# Patient Record
Sex: FEMALE | Race: BLACK OR AFRICAN AMERICAN | NOT HISPANIC OR LATINO | URBAN - METROPOLITAN AREA
[De-identification: names, ages, dates, MRNs, and addresses within clinical notes are randomized per-mention and may not be internally consistent; named-entity substitution may affect disease eponyms.]

---

## 2023-04-28 ENCOUNTER — OFFICE VISIT (OUTPATIENT)
Dept: URBAN - METROPOLITAN AREA CLINIC 84 | Facility: CLINIC | Age: 63
End: 2023-04-28

## 2023-04-28 RX ORDER — BUDESONIDE AND FORMOTEROL FUMARATE DIHYDRATE 160; 4.5 UG/1; UG/1
INHALE 2 PUFFS BY INHALATION ROUTE 2 TIMES PER DAY IN THE MORNING AND EVENING AEROSOL RESPIRATORY (INHALATION) 2
Qty: 1 | Refills: 0 | COMMUNITY
Start: 1900-01-01

## 2023-04-28 RX ORDER — IBUPROFEN 200 MG
TAKE 1 TABLET (200 MG) BY ORAL ROUTE EVERY 6 HOURS AS NEEDED WITH FOOD TABLET ORAL
Qty: 0 | Refills: 0 | COMMUNITY
Start: 1900-01-01

## 2023-04-28 RX ORDER — FLUTICASONE PROPIONATE 50 UG/1
SPRAY ONCE IN EACH NOSTRIL ONCE DAILY SPRAY, METERED NASAL
Qty: 16 GRAM | Refills: 0 | Status: ACTIVE | COMMUNITY

## 2023-04-28 RX ORDER — ALBUTEROL SULFATE 108 UG/1
INHALE 1 PUFF (90 MCG) BY INHALATION ROUTE EVERY 4-6 HOURS AS NEEDED AEROSOL, METERED RESPIRATORY (INHALATION)
Qty: 1 | Refills: 0 | COMMUNITY
Start: 1900-01-01

## 2023-04-28 RX ORDER — CETIRIZINE HYDROCHLORIDE TABLETS 10 MG/1
TAKE 1 TABLET BY MOUTH EVERY DAY AS NEEDED TABLET, FILM COATED ORAL
Qty: 30 EACH | Refills: 0 | Status: ACTIVE | COMMUNITY

## 2023-06-28 ENCOUNTER — OFFICE VISIT (OUTPATIENT)
Dept: URBAN - METROPOLITAN AREA CLINIC 92 | Facility: CLINIC | Age: 63
End: 2023-06-28

## 2023-08-22 ENCOUNTER — DASHBOARD ENCOUNTERS (OUTPATIENT)
Age: 63
End: 2023-08-22

## 2023-08-22 ENCOUNTER — LAB OUTSIDE AN ENCOUNTER (OUTPATIENT)
Dept: URBAN - METROPOLITAN AREA CLINIC 92 | Facility: CLINIC | Age: 63
End: 2023-08-22

## 2023-08-22 ENCOUNTER — OFFICE VISIT (OUTPATIENT)
Dept: URBAN - METROPOLITAN AREA CLINIC 92 | Facility: CLINIC | Age: 63
End: 2023-08-22
Payer: COMMERCIAL

## 2023-08-22 ENCOUNTER — TELEPHONE ENCOUNTER (OUTPATIENT)
Dept: URBAN - METROPOLITAN AREA CLINIC 92 | Facility: CLINIC | Age: 63
End: 2023-08-22

## 2023-08-22 ENCOUNTER — WEB ENCOUNTER (OUTPATIENT)
Dept: URBAN - METROPOLITAN AREA CLINIC 92 | Facility: CLINIC | Age: 63
End: 2023-08-22

## 2023-08-22 VITALS
DIASTOLIC BLOOD PRESSURE: 80 MMHG | HEART RATE: 72 BPM | HEIGHT: 62 IN | WEIGHT: 132 LBS | BODY MASS INDEX: 24.29 KG/M2 | SYSTOLIC BLOOD PRESSURE: 168 MMHG | TEMPERATURE: 97.2 F

## 2023-08-22 DIAGNOSIS — Z12.11 SCREEN FOR COLON CANCER: ICD-10-CM

## 2023-08-22 DIAGNOSIS — K76.89 HEPATIC CYST: ICD-10-CM

## 2023-08-22 PROCEDURE — 99204 OFFICE O/P NEW MOD 45 MIN: CPT | Performed by: INTERNAL MEDICINE

## 2023-08-22 RX ORDER — SOD SULF/POT CHLORIDE/MAG SULF 1.479 G
12 TABLETS THE FIRST DOSE THE EVENING BEFORE AND SECOND DOSE THE MORNING OF COLONOSCOPY TABLET ORAL TWICE A DAY
Qty: 24 | OUTPATIENT
Start: 2023-08-22 | End: 2023-08-23

## 2023-08-22 RX ORDER — FLUTICASONE PROPIONATE 50 UG/1
SPRAY ONCE IN EACH NOSTRIL ONCE DAILY SPRAY, METERED NASAL
Qty: 16 GRAM | Refills: 0 | Status: ACTIVE | COMMUNITY

## 2023-08-22 RX ORDER — BUDESONIDE AND FORMOTEROL FUMARATE DIHYDRATE 160; 4.5 UG/1; UG/1
INHALE 2 PUFFS BY INHALATION ROUTE 2 TIMES PER DAY IN THE MORNING AND EVENING AEROSOL RESPIRATORY (INHALATION) 2
Qty: 1 | Refills: 0 | COMMUNITY
Start: 1900-01-01

## 2023-08-22 RX ORDER — ALBUTEROL SULFATE 108 UG/1
INHALE 1 PUFF (90 MCG) BY INHALATION ROUTE EVERY 4-6 HOURS AS NEEDED AEROSOL, METERED RESPIRATORY (INHALATION)
Qty: 1 | Refills: 0 | COMMUNITY
Start: 1900-01-01

## 2023-08-22 RX ORDER — CETIRIZINE HYDROCHLORIDE TABLETS 10 MG/1
TAKE 1 TABLET BY MOUTH EVERY DAY AS NEEDED TABLET, FILM COATED ORAL
Qty: 30 EACH | Refills: 0 | Status: ACTIVE | COMMUNITY

## 2023-08-22 RX ORDER — IBUPROFEN 200 MG
TAKE 1 TABLET (200 MG) BY ORAL ROUTE EVERY 6 HOURS AS NEEDED WITH FOOD TABLET ORAL
Qty: 0 | Refills: 0 | COMMUNITY
Start: 1900-01-01

## 2023-08-22 NOTE — HPI-TODAY'S VISIT:
62yF with a hx of COPD, lung CA, HTN, BPAD who presents to discuss liver cysts and CRC screening.  Notes liver cysts on CT since at least 2019.   Never had a colonoscopy, no fam hx of CRC.

## 2023-08-30 ENCOUNTER — TELEPHONE ENCOUNTER (OUTPATIENT)
Dept: URBAN - METROPOLITAN AREA CLINIC 3 | Facility: CLINIC | Age: 63
End: 2023-08-30

## 2023-09-25 ENCOUNTER — OFFICE VISIT (OUTPATIENT)
Dept: URBAN - METROPOLITAN AREA SURGERY CENTER 16 | Facility: SURGERY CENTER | Age: 63
End: 2023-09-25

## 2024-01-17 ENCOUNTER — TELEPHONE ENCOUNTER (OUTPATIENT)
Dept: URBAN - METROPOLITAN AREA CLINIC 92 | Facility: CLINIC | Age: 64
End: 2024-01-17